# Patient Record
Sex: MALE | Race: WHITE
[De-identification: names, ages, dates, MRNs, and addresses within clinical notes are randomized per-mention and may not be internally consistent; named-entity substitution may affect disease eponyms.]

---

## 2020-01-20 ENCOUNTER — HOSPITAL ENCOUNTER (OUTPATIENT)
Dept: HOSPITAL 94 - SSTAY O | Age: 70
Discharge: HOME | End: 2020-01-20
Attending: INTERNAL MEDICINE
Payer: MEDICARE

## 2020-01-20 VITALS — HEIGHT: 67 IN | BODY MASS INDEX: 36.37 KG/M2 | WEIGHT: 231.71 LBS

## 2020-01-20 VITALS — DIASTOLIC BLOOD PRESSURE: 58 MMHG | SYSTOLIC BLOOD PRESSURE: 116 MMHG

## 2020-01-20 VITALS — SYSTOLIC BLOOD PRESSURE: 159 MMHG | DIASTOLIC BLOOD PRESSURE: 81 MMHG

## 2020-01-20 VITALS — DIASTOLIC BLOOD PRESSURE: 107 MMHG | SYSTOLIC BLOOD PRESSURE: 162 MMHG

## 2020-01-20 VITALS — SYSTOLIC BLOOD PRESSURE: 110 MMHG | DIASTOLIC BLOOD PRESSURE: 71 MMHG

## 2020-01-20 VITALS — SYSTOLIC BLOOD PRESSURE: 156 MMHG | DIASTOLIC BLOOD PRESSURE: 86 MMHG

## 2020-01-20 VITALS — DIASTOLIC BLOOD PRESSURE: 86 MMHG | SYSTOLIC BLOOD PRESSURE: 139 MMHG

## 2020-01-20 VITALS — DIASTOLIC BLOOD PRESSURE: 86 MMHG | SYSTOLIC BLOOD PRESSURE: 165 MMHG

## 2020-01-20 VITALS — DIASTOLIC BLOOD PRESSURE: 66 MMHG | SYSTOLIC BLOOD PRESSURE: 118 MMHG

## 2020-01-20 VITALS — DIASTOLIC BLOOD PRESSURE: 69 MMHG | SYSTOLIC BLOOD PRESSURE: 123 MMHG

## 2020-01-20 DIAGNOSIS — E78.5: ICD-10-CM

## 2020-01-20 DIAGNOSIS — I25.10: ICD-10-CM

## 2020-01-20 DIAGNOSIS — Z98.890: ICD-10-CM

## 2020-01-20 DIAGNOSIS — Z79.4: ICD-10-CM

## 2020-01-20 DIAGNOSIS — M79.662: ICD-10-CM

## 2020-01-20 DIAGNOSIS — E11.9: ICD-10-CM

## 2020-01-20 DIAGNOSIS — F17.210: ICD-10-CM

## 2020-01-20 DIAGNOSIS — J44.9: ICD-10-CM

## 2020-01-20 DIAGNOSIS — Z88.5: ICD-10-CM

## 2020-01-20 DIAGNOSIS — I70.211: Primary | ICD-10-CM

## 2020-01-20 DIAGNOSIS — Z79.899: ICD-10-CM

## 2020-01-20 DIAGNOSIS — Z95.5: ICD-10-CM

## 2020-01-20 DIAGNOSIS — I10: ICD-10-CM

## 2020-01-20 LAB
ALBUMIN SERPL BCP-MCNC: 3.9 G/DL (ref 3.4–5)
ANION GAP SERPL CALCULATED.3IONS-SCNC: 10 MMOL/L (ref 8–16)
BASOPHILS # BLD AUTO: 0 X10'3 (ref 0–0.2)
BASOPHILS NFR BLD AUTO: 0.3 % (ref 0–1)
BUN SERPL-MCNC: 23 MG/DL (ref 7–18)
BUN/CREAT SERPL: 16.9 (ref 5.4–32)
CALCIUM SERPL-MCNC: 9.1 MG/DL (ref 8.5–10.1)
CHLORIDE SERPL-SCNC: 105 MMOL/L (ref 99–107)
CO2 SERPL-SCNC: 26.8 MMOL/L (ref 24–32)
CREAT SERPL-MCNC: 1.36 MG/DL (ref 0.6–1.1)
EOSINOPHIL # BLD AUTO: 0.1 X10'3 (ref 0–0.9)
EOSINOPHIL NFR BLD AUTO: 1.3 % (ref 0–6)
ERYTHROCYTE [DISTWIDTH] IN BLOOD BY AUTOMATED COUNT: 14.6 % (ref 11.5–14.5)
GFR SERPL CREATININE-BSD FRML MDRD: 52 ML/MIN
GLUCOSE SERPL-MCNC: 167 MG/DL (ref 70–104)
HCT VFR BLD AUTO: 40.5 % (ref 42–52)
HGB BLD-MCNC: 13.6 G/DL (ref 14–17.9)
LYMPHOCYTES # BLD AUTO: 1.4 X10'3 (ref 1.1–4.8)
LYMPHOCYTES NFR BLD AUTO: 18.5 % (ref 21–51)
MAGNESIUM SERPL-MCNC: 1.7 MG/DL (ref 1.5–2.4)
MCH RBC QN AUTO: 29.2 PG (ref 27–31)
MCHC RBC AUTO-ENTMCNC: 33.5 G/DL (ref 33–36.5)
MCV RBC AUTO: 87.2 FL (ref 78–98)
MONOCYTES # BLD AUTO: 0.6 X10'3 (ref 0–0.9)
MONOCYTES NFR BLD AUTO: 8.5 % (ref 2–12)
NEUTROPHILS # BLD AUTO: 5.3 X10'3 (ref 1.8–7.7)
NEUTROPHILS NFR BLD AUTO: 71.4 % (ref 42–75)
PLATELET # BLD AUTO: 194 X10'3 (ref 140–440)
PMV BLD AUTO: 8.3 FL (ref 7.4–10.4)
POTASSIUM SERPL-SCNC: 4 MMOL/L (ref 3.5–5.1)
RBC # BLD AUTO: 4.65 X10'6 (ref 4.7–6.1)
SODIUM SERPL-SCNC: 142 MMOL/L (ref 135–145)
WBC # BLD AUTO: 7.5 X10'3 (ref 4.5–11)

## 2020-01-20 PROCEDURE — 85610 PROTHROMBIN TIME: CPT

## 2020-01-20 PROCEDURE — 83735 ASSAY OF MAGNESIUM: CPT

## 2020-01-20 PROCEDURE — 36415 COLL VENOUS BLD VENIPUNCTURE: CPT

## 2020-01-20 PROCEDURE — 36247 INS CATH ABD/L-EXT ART 3RD: CPT

## 2020-01-20 PROCEDURE — 93005 ELECTROCARDIOGRAM TRACING: CPT

## 2020-01-20 PROCEDURE — 99152 MOD SED SAME PHYS/QHP 5/>YRS: CPT

## 2020-01-20 PROCEDURE — 85025 COMPLETE CBC W/AUTO DIFF WBC: CPT

## 2020-01-20 PROCEDURE — 37225: CPT

## 2020-01-20 PROCEDURE — 37224: CPT

## 2020-01-20 PROCEDURE — 99153 MOD SED SAME PHYS/QHP EA: CPT

## 2020-01-20 PROCEDURE — 75710 ARTERY X-RAYS ARM/LEG: CPT

## 2020-01-20 PROCEDURE — 80048 BASIC METABOLIC PNL TOTAL CA: CPT

## 2020-07-16 LAB
ALBUMIN SERPL BCP-MCNC: 3.7 G/DL (ref 3.4–5)
ALBUMIN/GLOB SERPL: 0.9 {RATIO} (ref 1.1–1.5)
ALP SERPL-CCNC: 76 IU/L (ref 46–116)
ALT SERPL W P-5'-P-CCNC: 58 U/L (ref 30–65)
ANION GAP SERPL CALCULATED.3IONS-SCNC: 11 MMOL/L (ref 8–16)
APTT PPP: 28 SECONDS (ref 22–32)
AST SERPL W P-5'-P-CCNC: 37 U/L (ref 10–37)
BACTERIA URNS QL MICRO: (no result) /HPF
BASOPHILS # BLD AUTO: 0 X10'3 (ref 0–0.2)
BASOPHILS NFR BLD AUTO: 0.4 % (ref 0–1)
BILIRUB SERPL-MCNC: 0.3 MG/DL (ref 0–1)
BUN SERPL-MCNC: 16 MG/DL (ref 7–18)
BUN/CREAT SERPL: 10.8 (ref 5.4–32)
CALCIUM SERPL-MCNC: 9.1 MG/DL (ref 8.5–10.1)
CHLORIDE SERPL-SCNC: 105 MMOL/L (ref 99–107)
CLARITY UR: (no result)
CO2 SERPL-SCNC: 26.1 MMOL/L (ref 24–32)
COLOR UR: YELLOW
CREAT SERPL-MCNC: 1.48 MG/DL (ref 0.6–1.1)
DEPRECATED SQUAMOUS URNS QL MICRO: (no result) /LPF
EOSINOPHIL # BLD AUTO: 0.1 X10'3 (ref 0–0.9)
EOSINOPHIL NFR BLD AUTO: 1.4 % (ref 0–6)
ERYTHROCYTE [DISTWIDTH] IN BLOOD BY AUTOMATED COUNT: 14.5 % (ref 11.5–14.5)
GFR SERPL CREATININE-BSD FRML MDRD: 47 ML/MIN
GLUCOSE SERPL-MCNC: 171 MG/DL (ref 70–104)
GLUCOSE UR STRIP-MCNC: >=1000 MG/DL
HBA1C MFR BLD: 9.3 % (ref 4.5–6.2)
HCT VFR BLD AUTO: 39 % (ref 42–52)
HGB BLD-MCNC: 12.8 G/DL (ref 14–17.9)
HGB UR QL STRIP: NEGATIVE
INR PPP: 1 INR
KETONES UR STRIP-MCNC: NEGATIVE MG/DL
LEUKOCYTE ESTERASE UR QL STRIP: NEGATIVE
LYMPHOCYTES # BLD AUTO: 1.6 X10'3 (ref 1.1–4.8)
LYMPHOCYTES NFR BLD AUTO: 21.9 % (ref 21–51)
MCH RBC QN AUTO: 28.9 PG (ref 27–31)
MCHC RBC AUTO-ENTMCNC: 32.8 G/DL (ref 33–36.5)
MCV RBC AUTO: 88.2 FL (ref 78–98)
MONOCYTES # BLD AUTO: 0.6 X10'3 (ref 0–0.9)
MONOCYTES NFR BLD AUTO: 8 % (ref 2–12)
NEUTROPHILS # BLD AUTO: 4.8 X10'3 (ref 1.8–7.7)
NEUTROPHILS NFR BLD AUTO: 68.3 % (ref 42–75)
NITRITE UR QL STRIP: NEGATIVE
PH UR STRIP: 5.5 [PH] (ref 4.8–8)
PLATELET # BLD AUTO: 209 X10'3 (ref 140–440)
PMV BLD AUTO: 8.2 FL (ref 7.4–10.4)
POTASSIUM SERPL-SCNC: 3.9 MMOL/L (ref 3.4–5.1)
PROT SERPL-MCNC: 7.6 G/DL (ref 6.4–8.2)
PROT UR QL STRIP: NEGATIVE MG/DL
PROTHROMBIN TIME: 10.3 SECONDS (ref 9–12)
RBC # BLD AUTO: 4.42 X10'6 (ref 4.7–6.1)
RBC #/AREA URNS HPF: (no result) /HPF (ref 0–2)
SODIUM SERPL-SCNC: 142 MMOL/L (ref 135–145)
SP GR UR STRIP: 1.01 (ref 1–1.03)
URN COLLECT METHOD CLASS: (no result)
UROBILINOGEN UR STRIP-MCNC: 0.2 E.U/DL (ref 0.2–1)
WBC #/AREA URNS HPF: (no result) /HPF (ref 0–4)

## 2020-07-22 ENCOUNTER — HOSPITAL ENCOUNTER (INPATIENT)
Dept: HOSPITAL 94 - PAS IN | Age: 70
LOS: 4 days | Discharge: NURSING, REHAB OR LONG-TERM CARE | DRG: 254 | End: 2020-07-26
Attending: SURGERY | Admitting: SURGERY
Payer: MEDICARE

## 2020-07-22 VITALS — SYSTOLIC BLOOD PRESSURE: 118 MMHG | DIASTOLIC BLOOD PRESSURE: 62 MMHG

## 2020-07-22 VITALS — DIASTOLIC BLOOD PRESSURE: 77 MMHG | SYSTOLIC BLOOD PRESSURE: 132 MMHG

## 2020-07-22 VITALS — DIASTOLIC BLOOD PRESSURE: 65 MMHG | SYSTOLIC BLOOD PRESSURE: 151 MMHG

## 2020-07-22 VITALS — SYSTOLIC BLOOD PRESSURE: 110 MMHG | DIASTOLIC BLOOD PRESSURE: 69 MMHG

## 2020-07-22 VITALS — SYSTOLIC BLOOD PRESSURE: 166 MMHG | DIASTOLIC BLOOD PRESSURE: 77 MMHG

## 2020-07-22 VITALS — DIASTOLIC BLOOD PRESSURE: 77 MMHG | SYSTOLIC BLOOD PRESSURE: 134 MMHG

## 2020-07-22 VITALS — DIASTOLIC BLOOD PRESSURE: 66 MMHG | SYSTOLIC BLOOD PRESSURE: 138 MMHG

## 2020-07-22 VITALS — SYSTOLIC BLOOD PRESSURE: 141 MMHG | DIASTOLIC BLOOD PRESSURE: 74 MMHG

## 2020-07-22 VITALS — SYSTOLIC BLOOD PRESSURE: 132 MMHG | DIASTOLIC BLOOD PRESSURE: 71 MMHG

## 2020-07-22 VITALS — DIASTOLIC BLOOD PRESSURE: 79 MMHG | SYSTOLIC BLOOD PRESSURE: 149 MMHG

## 2020-07-22 VITALS — SYSTOLIC BLOOD PRESSURE: 106 MMHG | DIASTOLIC BLOOD PRESSURE: 73 MMHG

## 2020-07-22 VITALS — DIASTOLIC BLOOD PRESSURE: 65 MMHG | SYSTOLIC BLOOD PRESSURE: 135 MMHG

## 2020-07-22 VITALS — HEIGHT: 69 IN | WEIGHT: 235 LBS | BODY MASS INDEX: 34.8 KG/M2

## 2020-07-22 VITALS — SYSTOLIC BLOOD PRESSURE: 138 MMHG | DIASTOLIC BLOOD PRESSURE: 71 MMHG

## 2020-07-22 VITALS — SYSTOLIC BLOOD PRESSURE: 152 MMHG | DIASTOLIC BLOOD PRESSURE: 74 MMHG

## 2020-07-22 VITALS — DIASTOLIC BLOOD PRESSURE: 74 MMHG | SYSTOLIC BLOOD PRESSURE: 139 MMHG

## 2020-07-22 VITALS — DIASTOLIC BLOOD PRESSURE: 75 MMHG | SYSTOLIC BLOOD PRESSURE: 123 MMHG

## 2020-07-22 VITALS — SYSTOLIC BLOOD PRESSURE: 103 MMHG | DIASTOLIC BLOOD PRESSURE: 66 MMHG

## 2020-07-22 VITALS — SYSTOLIC BLOOD PRESSURE: 152 MMHG | DIASTOLIC BLOOD PRESSURE: 78 MMHG

## 2020-07-22 DIAGNOSIS — K43.9: ICD-10-CM

## 2020-07-22 DIAGNOSIS — E78.5: ICD-10-CM

## 2020-07-22 DIAGNOSIS — E66.01: ICD-10-CM

## 2020-07-22 DIAGNOSIS — F17.210: ICD-10-CM

## 2020-07-22 DIAGNOSIS — Z82.49: ICD-10-CM

## 2020-07-22 DIAGNOSIS — E11.40: ICD-10-CM

## 2020-07-22 DIAGNOSIS — I25.10: ICD-10-CM

## 2020-07-22 DIAGNOSIS — J44.9: ICD-10-CM

## 2020-07-22 DIAGNOSIS — E11.51: Primary | ICD-10-CM

## 2020-07-22 DIAGNOSIS — K21.9: ICD-10-CM

## 2020-07-22 DIAGNOSIS — Z88.6: ICD-10-CM

## 2020-07-22 DIAGNOSIS — M19.90: ICD-10-CM

## 2020-07-22 DIAGNOSIS — Z95.1: ICD-10-CM

## 2020-07-22 DIAGNOSIS — F03.90: ICD-10-CM

## 2020-07-22 DIAGNOSIS — Z82.5: ICD-10-CM

## 2020-07-22 DIAGNOSIS — Z03.818: ICD-10-CM

## 2020-07-22 DIAGNOSIS — I10: ICD-10-CM

## 2020-07-22 PROCEDURE — 82948 REAGENT STRIP/BLOOD GLUCOSE: CPT

## 2020-07-22 PROCEDURE — 86920 COMPATIBILITY TEST SPIN: CPT

## 2020-07-22 PROCEDURE — 83036 HEMOGLOBIN GLYCOSYLATED A1C: CPT

## 2020-07-22 PROCEDURE — 97161 PT EVAL LOW COMPLEX 20 MIN: CPT

## 2020-07-22 PROCEDURE — 81001 URINALYSIS AUTO W/SCOPE: CPT

## 2020-07-22 PROCEDURE — 36415 COLL VENOUS BLD VENIPUNCTURE: CPT

## 2020-07-22 PROCEDURE — 86901 BLOOD TYPING SEROLOGIC RH(D): CPT

## 2020-07-22 PROCEDURE — 80053 COMPREHEN METABOLIC PANEL: CPT

## 2020-07-22 PROCEDURE — 97116 GAIT TRAINING THERAPY: CPT

## 2020-07-22 PROCEDURE — 85730 THROMBOPLASTIN TIME PARTIAL: CPT

## 2020-07-22 PROCEDURE — 94760 N-INVAS EAR/PLS OXIMETRY 1: CPT

## 2020-07-22 PROCEDURE — 86885 COOMBS TEST INDIRECT QUAL: CPT

## 2020-07-22 PROCEDURE — 87081 CULTURE SCREEN ONLY: CPT

## 2020-07-22 PROCEDURE — 04CK0ZZ EXTIRPATION OF MATTER FROM RIGHT FEMORAL ARTERY, OPEN APPROACH: ICD-10-PCS | Performed by: SURGERY

## 2020-07-22 PROCEDURE — 97530 THERAPEUTIC ACTIVITIES: CPT

## 2020-07-22 PROCEDURE — 85610 PROTHROMBIN TIME: CPT

## 2020-07-22 PROCEDURE — 94640 AIRWAY INHALATION TREATMENT: CPT

## 2020-07-22 PROCEDURE — 71046 X-RAY EXAM CHEST 2 VIEWS: CPT

## 2020-07-22 PROCEDURE — 93005 ELECTROCARDIOGRAM TRACING: CPT

## 2020-07-22 PROCEDURE — 85025 COMPLETE CBC W/AUTO DIFF WBC: CPT

## 2020-07-22 PROCEDURE — 04UK0KZ SUPPLEMENT RIGHT FEMORAL ARTERY WITH NONAUTOLOGOUS TISSUE SUBSTITUTE, OPEN APPROACH: ICD-10-PCS | Performed by: SURGERY

## 2020-07-22 PROCEDURE — 86900 BLOOD TYPING SEROLOGIC ABO: CPT

## 2020-07-22 RX ADMIN — HYDROCODONE BITARTRATE AND ACETAMINOPHEN PRN TAB: 10; 325 TABLET ORAL at 18:58

## 2020-07-22 RX ADMIN — SODIUM CHLORIDE SCH MLS/HR: 9 INJECTION INTRAMUSCULAR; INTRAVENOUS; SUBCUTANEOUS at 17:40

## 2020-07-22 RX ADMIN — Medication SCH MG: at 20:58

## 2020-07-22 RX ADMIN — Medication SCH UNIT: at 18:10

## 2020-07-22 NOTE — NUR
Problems reprioritized. Patient report given, questions answered & plan of care reviewed 
with LALITHA Mays. Informed nurse post op vitals need finished, and pt needs a late dinner 
tray.

## 2020-07-22 NOTE — NUR
Report called to receiving nurse. Transferred via ICU BED WITH 2 BAGS OF Belongings . 

Special Issues communicated to receiving nurse SHIRLEY DOTY. PASSIVE TRANSFERED TO PCU BED 
WITH 3 PERSON ASSIST ( PASSIVE TRANSFER ). VAC INTACT, SUCTION AT 75 MM HG MAINTAINING 
SUCTION WITH NO AUDIBLE LEAKS. PAIN AT 3-10 PAIN. + DP AND PT. RN'S PRESENT TO ASSIST AT 
BEDSIDE. VSS.


-------------------------------------------------------------------------------

Addendum: 07/22/20 at 1720 by Mario Salomno RN, RN

-------------------------------------------------------------------------------

Amended: Links added.

## 2020-07-22 NOTE — NUR
Patient in room PAS . I have received report from Mario DOTY and had the opportunity to 
ask questions and awaiting patients arrival.

## 2020-07-22 NOTE — NUR
Patient in room PCU 3028. I have received report from Vesna and had the opportunity to ask 
questions and assume patient care.

## 2020-07-22 NOTE — NUR
Pt arrived from PACU, 3 person assist from ICU bed to PCU bed. Pt alert and oriented x4. 
Fall risks in place: BLL, SRx2, CL within reach, non skid socks on. SCD's placed, Wound VAC 
set at 75 low continuous suction. Encinas Cath in place, below pt to gravity. Post op vitals 
started. 2 RN skin check complete. MRSA collected, sent to lab.

## 2020-07-22 NOTE — NUR
Received from OR via ICU BED , accompanied by Anesthesiologist JESENIA and report given 
by Anesthesiolgist. PATIENT WITH 20G PIV IN RIGHT UE AS WELL AS ART LINE. RIGHT INGUINAL 
WOUND VAC SITE PRESENT. DENIES PAIN 2' SPINAL ANESTHESIA. PATIENT SENSATION LEVEL AT L1 
CURRENTLY. + DP AND PT TO RIGHT FOOT WITH DOPPLER. STRONG PULSES. SITES MARKED. VSS


-------------------------------------------------------------------------------

Addendum: 07/22/20 at 1603 by Mario Salomon RN, RN

-------------------------------------------------------------------------------

Amended: Links added.

## 2020-07-23 VITALS — DIASTOLIC BLOOD PRESSURE: 67 MMHG | SYSTOLIC BLOOD PRESSURE: 125 MMHG

## 2020-07-23 VITALS — DIASTOLIC BLOOD PRESSURE: 60 MMHG | SYSTOLIC BLOOD PRESSURE: 110 MMHG

## 2020-07-23 VITALS — SYSTOLIC BLOOD PRESSURE: 100 MMHG | DIASTOLIC BLOOD PRESSURE: 55 MMHG

## 2020-07-23 VITALS — SYSTOLIC BLOOD PRESSURE: 143 MMHG | DIASTOLIC BLOOD PRESSURE: 61 MMHG

## 2020-07-23 VITALS — SYSTOLIC BLOOD PRESSURE: 158 MMHG | DIASTOLIC BLOOD PRESSURE: 87 MMHG

## 2020-07-23 VITALS — DIASTOLIC BLOOD PRESSURE: 44 MMHG | SYSTOLIC BLOOD PRESSURE: 95 MMHG

## 2020-07-23 RX ADMIN — VENLAFAXINE HYDROCHLORIDE SCH MG: 75 CAPSULE, EXTENDED RELEASE ORAL at 09:01

## 2020-07-23 RX ADMIN — PANTOPRAZOLE SODIUM SCH MG: 40 TABLET, DELAYED RELEASE ORAL at 09:02

## 2020-07-23 RX ADMIN — ISOSORBIDE MONONITRATE SCH MG: 30 TABLET, EXTENDED RELEASE ORAL at 09:02

## 2020-07-23 RX ADMIN — Medication SCH UNIT: at 08:00

## 2020-07-23 RX ADMIN — SODIUM CHLORIDE SCH MLS/HR: 9 INJECTION INTRAMUSCULAR; INTRAVENOUS; SUBCUTANEOUS at 12:56

## 2020-07-23 RX ADMIN — INSULIN GLARGINE SCH UNIT: 100 INJECTION, SOLUTION SUBCUTANEOUS at 20:47

## 2020-07-23 RX ADMIN — HYDROCODONE BITARTRATE AND ACETAMINOPHEN PRN TAB: 10; 325 TABLET ORAL at 20:24

## 2020-07-23 RX ADMIN — MEMANTINE HYDROCHLORIDE SCH MG: 7 CAPSULE, EXTENDED RELEASE ORAL at 15:40

## 2020-07-23 RX ADMIN — INSULIN LISPRO SCH UNITS: 100 INJECTION, SOLUTION INTRAVENOUS; SUBCUTANEOUS at 13:49

## 2020-07-23 RX ADMIN — Medication SCH MG: at 20:17

## 2020-07-23 RX ADMIN — SODIUM CHLORIDE SCH MLS/HR: 9 INJECTION INTRAMUSCULAR; INTRAVENOUS; SUBCUTANEOUS at 22:55

## 2020-07-23 RX ADMIN — HYDROCODONE BITARTRATE AND ACETAMINOPHEN PRN TAB: 10; 325 TABLET ORAL at 12:51

## 2020-07-23 RX ADMIN — SODIUM CHLORIDE SCH MLS/HR: 9 INJECTION INTRAMUSCULAR; INTRAVENOUS; SUBCUTANEOUS at 03:43

## 2020-07-23 NOTE — NUR
RECD PT FROM U. PT CONFUSED, BASELINE, VSS

-------------------------------------------------------------------------------

Addendum: 07/23/20 at 1832 by Martha Pacheco RN

-------------------------------------------------------------------------------

Amended: Links added.

## 2020-07-23 NOTE — NUR
Wife brought patient's home medications - Namenda and Jardiance today. Medications stored in 
the pharmacy

## 2020-07-23 NOTE — NUR
Problems reprioritized. Patient report given, questions answered & plan of care reviewed 
with PATEL DOTY.

## 2020-07-23 NOTE — NUR
Patient has been off restraints for 2 hours without any problem. Patient is confused but 
able to follow commands, cooperative, not attempting to pull out IV, masterson catheter or wound 
vac. Patient need reorientation and redirection as necessary.

## 2020-07-23 NOTE — NUR
Patient has been confused, AOx1 to name. He stated that he has dementia and needed repeated 
reorientation during the night. At 0100 the patient awoke confused and aggressive. He 
threatened staff and attempted to pull catheter, iv's and drainage tubing from his wound vac 
placed at the femoral site. After attempting to reorientate the patient without success I 
called Dr. Rodriguez for further orders. The patient was placed in restraints and 10 mg 
geodon was administered.

## 2020-07-23 NOTE — NUR
Dr. Rodriguez seen the patient. Received order to transfer this patient to Surgical flr and 
Ativan PO PRN for agitation/anxiety

## 2020-07-23 NOTE — NUR
DM consult, A1c is 9.3; needs written DM education handout with verbal 

review. Per teaching records patient is currently confused. Will monitor appropriateness for 
education. 




-------------------------------------------------------------------------------

Addendum: 07/23/20 at 1534 by Erika Smith RD

-------------------------------------------------------------------------------

Amended: Links added.

## 2020-07-23 NOTE — NUR
Problems reprioritized. Patient report given, questions answered & plan of care reviewed 
with Maria Elena DOTY.

## 2020-07-23 NOTE — NUR
Patient's wife was notified about patient's on wrist restraints, the reason for restraints, 
and the criteria for removing the restraints. She verbalized understanding of why we needed 
to put patient on restraints

## 2020-07-23 NOTE — NUR
Received order from Dr. Rodriguez to start patient on hyperglycemia/hypoglycemia protocol. 
Pharmacist Julieta notified about this

## 2020-07-23 NOTE — NUR
Patient in room PCU 3028. I have received report from Uma DOTY and had the opportunity to 
ask questions and assume patient care.

## 2020-07-24 VITALS — DIASTOLIC BLOOD PRESSURE: 62 MMHG | SYSTOLIC BLOOD PRESSURE: 130 MMHG

## 2020-07-24 VITALS — SYSTOLIC BLOOD PRESSURE: 83 MMHG | DIASTOLIC BLOOD PRESSURE: 52 MMHG

## 2020-07-24 VITALS — SYSTOLIC BLOOD PRESSURE: 114 MMHG | DIASTOLIC BLOOD PRESSURE: 66 MMHG

## 2020-07-24 VITALS — SYSTOLIC BLOOD PRESSURE: 90 MMHG | DIASTOLIC BLOOD PRESSURE: 54 MMHG

## 2020-07-24 RX ADMIN — SODIUM CHLORIDE SCH MLS/HR: 9 INJECTION INTRAMUSCULAR; INTRAVENOUS; SUBCUTANEOUS at 18:37

## 2020-07-24 RX ADMIN — HYDROCODONE BITARTRATE AND ACETAMINOPHEN PRN TAB: 10; 325 TABLET ORAL at 05:06

## 2020-07-24 RX ADMIN — Medication SCH MG: at 20:10

## 2020-07-24 RX ADMIN — INSULIN GLARGINE SCH UNIT: 100 INJECTION, SOLUTION SUBCUTANEOUS at 19:38

## 2020-07-24 RX ADMIN — VENLAFAXINE HYDROCHLORIDE SCH MG: 75 CAPSULE, EXTENDED RELEASE ORAL at 08:27

## 2020-07-24 RX ADMIN — INSULIN LISPRO SCH UNITS: 100 INJECTION, SOLUTION INTRAVENOUS; SUBCUTANEOUS at 08:37

## 2020-07-24 RX ADMIN — HYDROCODONE BITARTRATE AND ACETAMINOPHEN PRN TAB: 10; 325 TABLET ORAL at 08:24

## 2020-07-24 RX ADMIN — MEMANTINE HYDROCHLORIDE SCH MG: 7 CAPSULE, EXTENDED RELEASE ORAL at 08:27

## 2020-07-24 RX ADMIN — PANTOPRAZOLE SODIUM SCH MG: 40 TABLET, DELAYED RELEASE ORAL at 08:26

## 2020-07-24 RX ADMIN — Medication SCH UNIT: at 08:00

## 2020-07-24 RX ADMIN — SODIUM CHLORIDE SCH MLS/HR: 9 INJECTION INTRAMUSCULAR; INTRAVENOUS; SUBCUTANEOUS at 08:32

## 2020-07-24 RX ADMIN — ISOSORBIDE MONONITRATE SCH MG: 30 TABLET, EXTENDED RELEASE ORAL at 08:26

## 2020-07-24 NOTE — NUR
Patient blood sugar was not treated today because it was difficult to calculate the grams 
of carbs eaten at this time.  Patient will be treated after dinner.

## 2020-07-24 NOTE — NUR
Reported off to Andrew RN. Patient is awake and alert on room air. Call light and items of 
frequent use within reach.

## 2020-07-24 NOTE — NUR
Patient in room [facility]AVAL 358. I have received report from PATEL DOTY   and had the opportunity to 
ask questions and assume patient care.

## 2020-07-24 NOTE — NUR
Patient in room DHAVAL 358. I have received report from  Andrew DOTY and had the opportunity to ask 
questions and assume patient care.

## 2020-07-24 NOTE — NUR
Problems reprioritized. Patient report given, questions answered & plan of care reviewed 
with Carie DOTY.

## 2020-07-24 NOTE — NUR
paged Dr. Blevins concerning pt's blood sugars and medications. Pt is on hyper/hypoglycemic 
protocol and being covered for blood sugars and pt is also on home medication metformin 
2000mg. Dr. Blevins ordered to d/c metformin and that he doesn't need coverage tonight. will 
continue to monitor.

## 2020-07-25 VITALS — SYSTOLIC BLOOD PRESSURE: 132 MMHG | DIASTOLIC BLOOD PRESSURE: 70 MMHG

## 2020-07-25 VITALS — DIASTOLIC BLOOD PRESSURE: 63 MMHG | SYSTOLIC BLOOD PRESSURE: 119 MMHG

## 2020-07-25 VITALS — DIASTOLIC BLOOD PRESSURE: 56 MMHG | SYSTOLIC BLOOD PRESSURE: 98 MMHG

## 2020-07-25 VITALS — SYSTOLIC BLOOD PRESSURE: 118 MMHG | DIASTOLIC BLOOD PRESSURE: 59 MMHG

## 2020-07-25 RX ADMIN — SODIUM CHLORIDE SCH MLS/HR: 9 INJECTION INTRAMUSCULAR; INTRAVENOUS; SUBCUTANEOUS at 04:27

## 2020-07-25 RX ADMIN — INSULIN GLARGINE SCH UNIT: 100 INJECTION, SOLUTION SUBCUTANEOUS at 21:00

## 2020-07-25 RX ADMIN — ISOSORBIDE MONONITRATE SCH MG: 30 TABLET, EXTENDED RELEASE ORAL at 07:21

## 2020-07-25 RX ADMIN — INSULIN LISPRO SCH UNITS: 100 INJECTION, SOLUTION INTRAVENOUS; SUBCUTANEOUS at 19:07

## 2020-07-25 RX ADMIN — Medication SCH UNIT: at 08:00

## 2020-07-25 RX ADMIN — HYDROCODONE BITARTRATE AND ACETAMINOPHEN PRN TAB: 10; 325 TABLET ORAL at 15:33

## 2020-07-25 RX ADMIN — PANTOPRAZOLE SODIUM SCH MG: 40 TABLET, DELAYED RELEASE ORAL at 07:23

## 2020-07-25 RX ADMIN — SODIUM CHLORIDE SCH MLS/HR: 9 INJECTION INTRAMUSCULAR; INTRAVENOUS; SUBCUTANEOUS at 13:34

## 2020-07-25 RX ADMIN — Medication SCH MG: at 22:01

## 2020-07-25 RX ADMIN — MEMANTINE HYDROCHLORIDE SCH MG: 7 CAPSULE, EXTENDED RELEASE ORAL at 07:26

## 2020-07-25 RX ADMIN — VENLAFAXINE HYDROCHLORIDE SCH MG: 75 CAPSULE, EXTENDED RELEASE ORAL at 07:21

## 2020-07-25 RX ADMIN — HYDROCODONE BITARTRATE AND ACETAMINOPHEN PRN TAB: 10; 325 TABLET ORAL at 07:24

## 2020-07-25 NOTE — NUR
Problems reprioritized. Patient report given, questions answered & plan of care reviewed 
with Andrew RN.

## 2020-07-25 NOTE — NUR
Patient just finished eating lunch at this time.  Patient will not be treated with insulin 
at this time because it is too close to next insulin administration.  Patient was 
hypoglycemic this morning and dropped a level in our protocol will resume at level one at 
dinner.

## 2020-07-25 NOTE — NUR
Patient in room [facility] 358. I have received report from Carie DOTY   and had the opportunity to 
ask questions and assume patient care.

## 2020-07-25 NOTE — NUR
Problems reprioritized. Patient report given, questions answered & plan of care reviewed 
with Senthil DOTY.

## 2020-07-26 VITALS — SYSTOLIC BLOOD PRESSURE: 112 MMHG | DIASTOLIC BLOOD PRESSURE: 55 MMHG

## 2020-07-26 VITALS — DIASTOLIC BLOOD PRESSURE: 55 MMHG | SYSTOLIC BLOOD PRESSURE: 112 MMHG

## 2020-07-26 VITALS — DIASTOLIC BLOOD PRESSURE: 69 MMHG | SYSTOLIC BLOOD PRESSURE: 128 MMHG

## 2020-07-26 VITALS — DIASTOLIC BLOOD PRESSURE: 70 MMHG | SYSTOLIC BLOOD PRESSURE: 129 MMHG

## 2020-07-26 RX ADMIN — VENLAFAXINE HYDROCHLORIDE SCH MG: 75 CAPSULE, EXTENDED RELEASE ORAL at 09:09

## 2020-07-26 RX ADMIN — MEMANTINE HYDROCHLORIDE SCH MG: 7 CAPSULE, EXTENDED RELEASE ORAL at 10:45

## 2020-07-26 RX ADMIN — SODIUM CHLORIDE SCH MLS/HR: 9 INJECTION INTRAMUSCULAR; INTRAVENOUS; SUBCUTANEOUS at 10:35

## 2020-07-26 RX ADMIN — PANTOPRAZOLE SODIUM SCH MG: 40 TABLET, DELAYED RELEASE ORAL at 09:09

## 2020-07-26 RX ADMIN — Medication SCH UNIT: at 08:00

## 2020-07-26 RX ADMIN — ISOSORBIDE MONONITRATE SCH MG: 30 TABLET, EXTENDED RELEASE ORAL at 09:10

## 2020-07-26 RX ADMIN — SODIUM CHLORIDE SCH MLS/HR: 9 INJECTION INTRAMUSCULAR; INTRAVENOUS; SUBCUTANEOUS at 00:35

## 2020-07-26 NOTE — NUR
Patient in room [facility]AVAL 359. I have received report from Senthil DOTY   and had the opportunity to ask 
questions and assume patient care.

## 2020-07-26 NOTE — NUR
1220 Report phoned to Lauren at [facility].  Pt stable and appropriate for transfer. PIV d/c'd 
cannula intact. [facility] [facility] here to transport patient to [facility]. Pt felt need to have a 
BM, no result. Pt down to Lobby with all personal belongings as well as home meds, 
accompanied by transport staff via [facility].

## 2020-08-20 ENCOUNTER — HOSPITAL ENCOUNTER (OUTPATIENT)
Dept: HOSPITAL 94 - WOUND CARE | Age: 70
Discharge: HOME | End: 2020-08-20
Attending: NURSE PRACTITIONER
Payer: MEDICARE

## 2020-08-20 DIAGNOSIS — F03.90: ICD-10-CM

## 2020-08-20 DIAGNOSIS — E78.5: ICD-10-CM

## 2020-08-20 DIAGNOSIS — F32.9: ICD-10-CM

## 2020-08-20 DIAGNOSIS — K21.9: ICD-10-CM

## 2020-08-20 DIAGNOSIS — F41.9: ICD-10-CM

## 2020-08-20 DIAGNOSIS — Z86.718: ICD-10-CM

## 2020-08-20 DIAGNOSIS — I10: ICD-10-CM

## 2020-08-20 DIAGNOSIS — E11.622: ICD-10-CM

## 2020-08-20 DIAGNOSIS — T81.89XD: Primary | ICD-10-CM
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
## 2020-08-20 DIAGNOSIS — J44.9: ICD-10-CM

## 2020-08-20 DIAGNOSIS — L98.492: ICD-10-CM

## 2020-08-20 DIAGNOSIS — E11.36: ICD-10-CM

## 2020-08-20 DIAGNOSIS — Z95.1: ICD-10-CM

## 2020-08-20 PROCEDURE — 82948 REAGENT STRIP/BLOOD GLUCOSE: CPT

## 2020-08-20 PROCEDURE — 36416 COLLJ CAPILLARY BLOOD SPEC: CPT

## 2020-08-20 PROCEDURE — 97597 DBRDMT OPN WND 1ST 20 CM/<: CPT

## 2020-08-20 PROCEDURE — 97598 DBRDMT OPN WND ADDL 20CM/<: CPT

## 2020-09-04 ENCOUNTER — HOSPITAL ENCOUNTER (OUTPATIENT)
Dept: HOSPITAL 94 - WOUND CARE | Age: 70
Discharge: HOME | End: 2020-09-04
Attending: NURSE PRACTITIONER
Payer: MEDICARE

## 2020-09-04 DIAGNOSIS — Z86.718: ICD-10-CM

## 2020-09-04 DIAGNOSIS — E11.622: ICD-10-CM

## 2020-09-04 DIAGNOSIS — E11.36: ICD-10-CM

## 2020-09-04 DIAGNOSIS — Z95.1: ICD-10-CM

## 2020-09-04 DIAGNOSIS — L98.492: ICD-10-CM

## 2020-09-04 DIAGNOSIS — F32.9: ICD-10-CM

## 2020-09-04 DIAGNOSIS — T81.89XD: Primary | ICD-10-CM

## 2020-09-04 DIAGNOSIS — E78.5: ICD-10-CM
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
## 2020-09-04 DIAGNOSIS — J44.9: ICD-10-CM

## 2020-09-04 DIAGNOSIS — I10: ICD-10-CM

## 2020-09-04 DIAGNOSIS — F03.90: ICD-10-CM

## 2020-09-04 DIAGNOSIS — F41.9: ICD-10-CM

## 2020-09-04 DIAGNOSIS — K21.9: ICD-10-CM

## 2020-09-04 PROCEDURE — 97597 DBRDMT OPN WND 1ST 20 CM/<: CPT

## 2020-09-04 PROCEDURE — 36416 COLLJ CAPILLARY BLOOD SPEC: CPT

## 2020-09-04 PROCEDURE — 82948 REAGENT STRIP/BLOOD GLUCOSE: CPT

## 2020-09-04 PROCEDURE — 97598 DBRDMT OPN WND ADDL 20CM/<: CPT

## 2020-09-11 ENCOUNTER — HOSPITAL ENCOUNTER (OUTPATIENT)
Dept: HOSPITAL 94 - WOUND CARE | Age: 70
Discharge: HOME | End: 2020-09-11
Attending: NURSE PRACTITIONER
Payer: MEDICARE

## 2020-09-11 DIAGNOSIS — E78.5: ICD-10-CM

## 2020-09-11 DIAGNOSIS — Z86.718: ICD-10-CM

## 2020-09-11 DIAGNOSIS — Z95.1: ICD-10-CM

## 2020-09-11 DIAGNOSIS — F03.90: ICD-10-CM

## 2020-09-11 DIAGNOSIS — F32.9: ICD-10-CM

## 2020-09-11 DIAGNOSIS — J44.9: ICD-10-CM

## 2020-09-11 DIAGNOSIS — I10: ICD-10-CM

## 2020-09-11 DIAGNOSIS — L98.492: ICD-10-CM

## 2020-09-11 DIAGNOSIS — K21.9: ICD-10-CM

## 2020-09-11 DIAGNOSIS — T81.89XD: Primary | ICD-10-CM

## 2020-09-11 DIAGNOSIS — F41.9: ICD-10-CM

## 2020-09-11 DIAGNOSIS — E11.622: ICD-10-CM
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
## 2020-09-11 DIAGNOSIS — E11.36: ICD-10-CM

## 2020-09-11 PROCEDURE — 97597 DBRDMT OPN WND 1ST 20 CM/<: CPT

## 2020-09-11 PROCEDURE — 82948 REAGENT STRIP/BLOOD GLUCOSE: CPT

## 2020-09-17 ENCOUNTER — HOSPITAL ENCOUNTER (OUTPATIENT)
Dept: HOSPITAL 94 - WOUND CARE | Age: 70
Discharge: HOME | End: 2020-09-17
Attending: NURSE PRACTITIONER
Payer: MEDICARE

## 2020-09-17 DIAGNOSIS — L98.492: ICD-10-CM

## 2020-09-17 DIAGNOSIS — F41.9: ICD-10-CM

## 2020-09-17 DIAGNOSIS — K21.9: ICD-10-CM

## 2020-09-17 DIAGNOSIS — E11.622: ICD-10-CM

## 2020-09-17 DIAGNOSIS — Z95.1: ICD-10-CM

## 2020-09-17 DIAGNOSIS — E78.5: ICD-10-CM

## 2020-09-17 DIAGNOSIS — T81.89XD: Primary | ICD-10-CM

## 2020-09-17 DIAGNOSIS — Z86.718: ICD-10-CM

## 2020-09-17 DIAGNOSIS — F03.90: ICD-10-CM
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
## 2020-09-17 DIAGNOSIS — I10: ICD-10-CM

## 2020-09-17 DIAGNOSIS — J44.9: ICD-10-CM

## 2020-09-17 DIAGNOSIS — E11.36: ICD-10-CM

## 2020-09-17 DIAGNOSIS — F32.9: ICD-10-CM

## 2020-09-17 PROCEDURE — 97597 DBRDMT OPN WND 1ST 20 CM/<: CPT

## 2020-09-24 ENCOUNTER — HOSPITAL ENCOUNTER (OUTPATIENT)
Dept: HOSPITAL 94 - WOUND CARE | Age: 70
Discharge: HOME | End: 2020-09-24
Attending: NURSE PRACTITIONER
Payer: MEDICARE

## 2020-09-24 DIAGNOSIS — Z95.1: ICD-10-CM

## 2020-09-24 DIAGNOSIS — F32.9: ICD-10-CM

## 2020-09-24 DIAGNOSIS — Z86.718: ICD-10-CM

## 2020-09-24 DIAGNOSIS — L98.492: ICD-10-CM
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
## 2020-09-24 DIAGNOSIS — E11.622: ICD-10-CM

## 2020-09-24 DIAGNOSIS — F03.90: ICD-10-CM

## 2020-09-24 DIAGNOSIS — E11.36: ICD-10-CM

## 2020-09-24 DIAGNOSIS — T81.89XD: Primary | ICD-10-CM

## 2020-09-24 DIAGNOSIS — J44.9: ICD-10-CM

## 2020-09-24 DIAGNOSIS — E78.5: ICD-10-CM

## 2020-09-24 DIAGNOSIS — K21.9: ICD-10-CM

## 2020-09-24 DIAGNOSIS — I10: ICD-10-CM

## 2020-09-24 DIAGNOSIS — F41.9: ICD-10-CM

## 2020-09-24 PROCEDURE — 97597 DBRDMT OPN WND 1ST 20 CM/<: CPT

## 2020-10-01 ENCOUNTER — HOSPITAL ENCOUNTER (OUTPATIENT)
Dept: HOSPITAL 94 - WOUND CARE | Age: 70
Discharge: HOME | End: 2020-10-01
Attending: NURSE PRACTITIONER
Payer: MEDICARE

## 2020-10-01 DIAGNOSIS — F41.9: ICD-10-CM

## 2020-10-01 DIAGNOSIS — T81.89XD: Primary | ICD-10-CM

## 2020-10-01 DIAGNOSIS — E11.36: ICD-10-CM

## 2020-10-01 DIAGNOSIS — F32.9: ICD-10-CM
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
## 2020-10-01 DIAGNOSIS — I10: ICD-10-CM

## 2020-10-01 DIAGNOSIS — E11.622: ICD-10-CM

## 2020-10-01 DIAGNOSIS — J44.9: ICD-10-CM

## 2020-10-01 DIAGNOSIS — F03.90: ICD-10-CM

## 2020-10-01 DIAGNOSIS — L98.492: ICD-10-CM

## 2020-10-01 DIAGNOSIS — Z86.718: ICD-10-CM

## 2020-10-01 DIAGNOSIS — E78.5: ICD-10-CM

## 2020-10-01 DIAGNOSIS — K21.9: ICD-10-CM

## 2020-10-01 DIAGNOSIS — Z95.1: ICD-10-CM

## 2020-10-01 PROCEDURE — 97597 DBRDMT OPN WND 1ST 20 CM/<: CPT

## 2020-10-01 PROCEDURE — 82948 REAGENT STRIP/BLOOD GLUCOSE: CPT

## 2020-10-01 PROCEDURE — 36416 COLLJ CAPILLARY BLOOD SPEC: CPT

## 2020-10-08 ENCOUNTER — HOSPITAL ENCOUNTER (OUTPATIENT)
Dept: HOSPITAL 94 - WOUND CARE | Age: 70
Discharge: HOME | End: 2020-10-08
Attending: NURSE PRACTITIONER
Payer: MEDICARE

## 2020-10-08 DIAGNOSIS — E11.36: ICD-10-CM

## 2020-10-08 DIAGNOSIS — T81.89XD: Primary | ICD-10-CM

## 2020-10-08 DIAGNOSIS — M19.90: ICD-10-CM

## 2020-10-08 DIAGNOSIS — L98.492: ICD-10-CM

## 2020-10-08 DIAGNOSIS — I10: ICD-10-CM

## 2020-10-08 DIAGNOSIS — F32.9: ICD-10-CM

## 2020-10-08 DIAGNOSIS — E11.622: ICD-10-CM

## 2020-10-08 DIAGNOSIS — Z95.1: ICD-10-CM
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
## 2020-10-08 DIAGNOSIS — E78.5: ICD-10-CM

## 2020-10-08 DIAGNOSIS — F03.90: ICD-10-CM

## 2020-10-08 DIAGNOSIS — Z86.718: ICD-10-CM

## 2020-10-08 DIAGNOSIS — F17.200: ICD-10-CM

## 2020-10-08 DIAGNOSIS — J44.9: ICD-10-CM

## 2020-10-08 DIAGNOSIS — F41.9: ICD-10-CM

## 2020-10-08 DIAGNOSIS — K21.9: ICD-10-CM

## 2020-10-08 PROCEDURE — 36416 COLLJ CAPILLARY BLOOD SPEC: CPT

## 2020-10-08 PROCEDURE — 97597 DBRDMT OPN WND 1ST 20 CM/<: CPT

## 2020-10-08 PROCEDURE — 82948 REAGENT STRIP/BLOOD GLUCOSE: CPT

## 2020-10-15 ENCOUNTER — HOSPITAL ENCOUNTER (OUTPATIENT)
Dept: HOSPITAL 94 - WOUND CARE | Age: 70
Discharge: HOME | End: 2020-10-15
Attending: NURSE PRACTITIONER
Payer: MEDICARE

## 2020-10-15 DIAGNOSIS — Z95.1: ICD-10-CM

## 2020-10-15 DIAGNOSIS — F41.9: ICD-10-CM

## 2020-10-15 DIAGNOSIS — E78.5: ICD-10-CM

## 2020-10-15 DIAGNOSIS — Z86.718: ICD-10-CM
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
## 2020-10-15 DIAGNOSIS — E11.622: ICD-10-CM

## 2020-10-15 DIAGNOSIS — F03.90: ICD-10-CM

## 2020-10-15 DIAGNOSIS — T81.89XD: Primary | ICD-10-CM

## 2020-10-15 DIAGNOSIS — L98.492: ICD-10-CM

## 2020-10-15 DIAGNOSIS — F32.9: ICD-10-CM

## 2020-10-15 DIAGNOSIS — J44.9: ICD-10-CM

## 2020-10-15 DIAGNOSIS — I10: ICD-10-CM

## 2020-10-15 DIAGNOSIS — K21.9: ICD-10-CM

## 2020-10-15 DIAGNOSIS — F17.200: ICD-10-CM

## 2020-10-15 DIAGNOSIS — M19.90: ICD-10-CM

## 2020-10-15 DIAGNOSIS — E11.36: ICD-10-CM

## 2020-10-15 PROCEDURE — 97597 DBRDMT OPN WND 1ST 20 CM/<: CPT

## 2020-10-15 PROCEDURE — 36416 COLLJ CAPILLARY BLOOD SPEC: CPT

## 2020-10-15 PROCEDURE — 82948 REAGENT STRIP/BLOOD GLUCOSE: CPT
